# Patient Record
Sex: MALE | Race: WHITE
[De-identification: names, ages, dates, MRNs, and addresses within clinical notes are randomized per-mention and may not be internally consistent; named-entity substitution may affect disease eponyms.]

---

## 2017-03-08 ENCOUNTER — HOSPITAL ENCOUNTER (OUTPATIENT)
Dept: HOSPITAL 53 - M LRY | Age: 42
End: 2017-03-08
Attending: PHYSICAL MEDICINE & REHABILITATION
Payer: OTHER GOVERNMENT

## 2017-03-08 DIAGNOSIS — M47.27: Primary | ICD-10-CM

## 2018-12-17 ENCOUNTER — HOSPITAL ENCOUNTER (OUTPATIENT)
Dept: HOSPITAL 53 - M OPP | Age: 43
Discharge: HOME | End: 2018-12-17
Attending: INTERNAL MEDICINE
Payer: COMMERCIAL

## 2018-12-17 VITALS — WEIGHT: 223.8 LBS | HEIGHT: 70 IN | BODY MASS INDEX: 32.04 KG/M2

## 2018-12-17 VITALS — SYSTOLIC BLOOD PRESSURE: 139 MMHG | DIASTOLIC BLOOD PRESSURE: 72 MMHG

## 2018-12-17 DIAGNOSIS — R68.81: ICD-10-CM

## 2018-12-17 DIAGNOSIS — R14.2: ICD-10-CM

## 2018-12-17 DIAGNOSIS — D12.5: Primary | ICD-10-CM

## 2018-12-17 DIAGNOSIS — Q43.8: ICD-10-CM

## 2018-12-17 DIAGNOSIS — R10.84: ICD-10-CM

## 2018-12-17 DIAGNOSIS — K64.8: ICD-10-CM

## 2018-12-17 DIAGNOSIS — K59.00: ICD-10-CM

## 2018-12-17 DIAGNOSIS — R12: ICD-10-CM

## 2018-12-17 PROCEDURE — 45385 COLONOSCOPY W/LESION REMOVAL: CPT

## 2018-12-17 PROCEDURE — 88305 TISSUE EXAM BY PATHOLOGIST: CPT

## 2018-12-17 PROCEDURE — 43235 EGD DIAGNOSTIC BRUSH WASH: CPT

## 2018-12-17 NOTE — ROOR
________________________________________________________________________________

Patient Name: Marcus Ordza            Procedure Date: 12/17/2018 1:43 PM

MRN: [id number]                          Account Number: [id number]

YOB: 1975               Age: 43

Room: [facility]                            Gender: Male

Note Status: Finalized                 

________________________________________________________________________________

 

Procedure:           Upper GI endoscopy

Indications:         Heartburn, Suspected gastroparesis, Early satiety, 

                     Eructation

Providers:           Rogerio OLIVAREZ MD

Referring MD:        FERMIN CHRISTENSEN MD

Requesting Provider: 

Medicines:           Monitored Anesthesia Care

Complications:       No immediate complications.

________________________________________________________________________________

Procedure:           Pre-Anesthesia Assessment:

                     - The heart rate, respiratory rate, oxygen saturations, 

                     blood pressure, adequacy of pulmonary ventilation, and 

                     response to care were monitored throughout the procedure.

                     The Endoscope was introduced through the mouth, and 

                     advanced to the second part of duodenum. The upper GI 

                     endoscopy was accomplished without difficulty. The 

                     patient tolerated the procedure well.

                                                                                

Findings:

     The esophagus was normal.

     The stomach was normal.

     The examined duodenum was normal.

                                                                                

Impression:          - Normal esophagus.

                     - Normal stomach.

                     - Normal examined duodenum.

                     - No specimens collected.

Recommendation:      - Continue present medications.

                     - Gastroparesis diet:

                     - Eat smaller, more frequent meals throughout the day.

                     - Low fat diet.

                     - Liquid/soft foods are tolerated better than solid foods.

                     - Low fiber/well cooked vegetables are tolerated better 

                     than high fiber/fibrous foods/raw vegetables.

                     - Avoid medications that inhibit gastric/intestinal 

                     motility such as narcotic medications.

                                                                                

 

Rogerio Olivarez MD

________________

Rogerio OLIVAREZ MD

12/17/2018 1:54:52 PM

This report has been signed electronically.

Number of Addenda: 0

 

Note Initiated On: 12/17/2018 1:43 PM

Estimated Blood Loss:

     Estimated blood loss: none.

## 2018-12-17 NOTE — ROOR
________________________________________________________________________________

Patient Name: Marcus Ordaz            Procedure Date: 12/17/2018 1:44 PM

MRN: [id number]                          Account Number: [id number]

YOB: 1975               Age: 43

Room: [facility]                            Gender: Male

Note Status: Finalized                 

________________________________________________________________________________

 

Procedure:           Colonoscopy

Indications:         Generalized abdominal pain, Change in bowel habits, 

                     Constipation

Providers:           Rogerio OLIVAREZ MD

Referring MD:        FERMIN CHRISTENSEN MD

Requesting Provider: 

Medicines:           Monitored Anesthesia Care

Complications:       No immediate complications.

________________________________________________________________________________

Procedure:           Pre-Anesthesia Assessment:

                     - The heart rate, respiratory rate, oxygen saturations, 

                     blood pressure, adequacy of pulmonary ventilation, and 

                     response to care were monitored throughout the procedure.

                     The Colonoscope was introduced through the anus and 

                     advanced to the cecum, identified by appendiceal orifice 

                     and ileocecal valve. The colonoscopy was performed 

                     without difficulty. The patient tolerated the procedure 

                     well. The quality of the bowel preparation was good.

                                                                                

Findings:

     The perianal and digital rectal examinations were normal.

     A 4 mm polyp was found in the sigmoid colon. The polyp was sessile. The 

     polyp was removed with a cold snare. Resection and retrieval were 

     complete.

     The exam was otherwise without abnormality on direct and retroflexion 

     views.

     The colon (entire examined portion) was redundant. Advancing the scope 

     required using manual pressure.

     Small Internal Hemorrhoids.

                                                                                

Impression:          - One 4 mm polyp in the sigmoid colon, removed with a 

                     cold snare. Resected and retrieved.

                     - Small Internal Hemorrhoids.

                     - The examination was otherwise normal on direct and 

                     retroflexion views.

Recommendation:      - Await pathology results.

                     - Telephone endoscopist for pathology results in 2 weeks.

                     - If the pathology report reveals adenomatous tissue, 

                     then repeat the colonoscopy for surveillance in 5 years.

                                                                                

 

Rogerio Olivarez MD

________________

Rogerio OLIVAREZ MD

12/17/2018 2:15:08 PM

This report has been signed electronically.

Number of Addenda: 0

 

Note Initiated On: 12/17/2018 1:44 PM

Estimated Blood Loss:

     Estimated blood loss: none.